# Patient Record
Sex: MALE | Race: BLACK OR AFRICAN AMERICAN | NOT HISPANIC OR LATINO | Employment: FULL TIME | URBAN - METROPOLITAN AREA
[De-identification: names, ages, dates, MRNs, and addresses within clinical notes are randomized per-mention and may not be internally consistent; named-entity substitution may affect disease eponyms.]

---

## 2017-04-03 ENCOUNTER — GENERIC CONVERSION - ENCOUNTER (OUTPATIENT)
Dept: OTHER | Facility: OTHER | Age: 42
End: 2017-04-03

## 2018-01-10 NOTE — MISCELLANEOUS
Dear Batsheva Aguiar : We missed you for your originally scheduled neurological followup appointment with Dr Maribeth Almanza  Please call at your earliest convenience to reschedule this appointment      Sincerely,     Sara Olmstead [address]        Electronically signed by:Cristal Mendoza   Apr  3 2017 11:59AM EST Co-author

## 2018-01-12 NOTE — PROCEDURES
Results/Data    Procedure: Electromyogram and Nerve Conduction Study  Indication: Left Upper and Lower Extremities   Referred by Dr Lori Gusman  The procedure's were discussed with the patient  Written consent was obtained prior to the procedure and is detailed in the patient's record  Prior to the start of the procedure a time out was taken and the identity of the patient was confirmed via name and date of birth with the patient  The correct site and the procedure to be performed were confirmed  The correct side was confirmed if applicable  The positioning of the patient was verified  The availability of the correct equipment was verified  Procedure Start Time: 12:10    Technique: A sterile concentric needle electrode was used  The patient tolerated the procedure well  There were no complications  Results  : Motor and sensory nerve conduction studies were performed on the median, ulnar, peroneal, tibial and sural nerves  The median, ulnar, peroneal and tibial compound motor action potentials were within normal limits  The median, ulnar, tibial F wave latencies were within normal limits  The peroneal F wave was mildly prolonged  The median, ulnar and sural sensory action potentials were within normal limits  The median palmar evoked response was prolonged by 0 4 ms as compared to the ulnar palmar evoked response at the same distance  The right H soleus response was mildly prolonged as compared to the left H soleus response  Concentric needle examination was performed on various proximal and distal muscles including deltoid, biceps, triceps, pronator teres, FDI, vastus lateralis, tibialis anterior, medial gastrocnemius, EDB, L4-5 and L5-S1 paraspinal myotomes  Patient refused testing of the APB secondary to intolerability to pain  Mild decreased recruitment of giant motor units was noted in the tibialis anterior and EDB   The compound motor unit action potentials were of normal configuration with interference patterns being full or full for effort in the remaining muscles tested  Interpretation: There is electrophysiologic evidence of a:    1  Mild median nerve compression neuropathy at the wrist with demyelinative changes, consistent with a diagnosis of carpal tunnel syndrome  2  Mild chronic L5 radiculopathy as evidenced by the decreased recruitment and chronic denervation changes in the above-mentioned muscles  Clinical and imaging correlation of the lumbosacral spine is suggested        Signatures   Electronically signed by : Nelly Allen MD; Jun 22 2016  1:07PM EST                       (Author)

## 2018-01-15 NOTE — RESULT NOTES
Verified Results  (1) CBC/PLT/DIFF [id number] 09:14AM [facility] Blood     Test Name Result Flag Reference   WBC 3 7 x10E3/uL  3 4-10 8   RBC 5 22 x10E6/uL  4 14-5 80   Hemoglobin 14 8 g/dL  12 6-17 7   Hematocrit 46 4 %  37 5-51 0   MCV 89 fL  79-97   MCH 28 4 pg  26 6-33 0   MCHC 31 9 g/dL  31 5-35 7   RDW 13 7 %  12 3-15 4   Platelets 099 N25R4/KE  150-379   Neutrophils 46 %     Lymphs 40 %     Monocytes 9 %     Eos 4 %     Basos 1 %     Neutrophils (Absolute) 1 7 x10E3/uL  1 4-7 0   Lymphs (Absolute) 1 5 x10E3/uL  0 7-3 1   Monocytes(Absolute) 0 3 x10E3/uL  0 1-0 9   Eos (Absolute) 0 1 x10E3/uL  0 0-0 4   Baso (Absolute) 0 0 x10E3/uL  0 0-0 2   Immature Granulocytes 0 %     Immature Grans (Abs) 0 0 x10E3/uL  0 0-0 1     (1) COMPREHENSIVE METABOLIC PANEL [id number] 28:60RK [facility] Blood     Test Name Result Flag Reference   Glucose, Serum 102 mg/dL H 65-99   BUN 19 mg/dL  6-20   Creatinine, Serum 1 13 mg/dL  0 76-1 27   eGFR If NonAfricn Am 81 mL/min/1 73  >59   eGFR If Africn Am 94 mL/min/1 73  >59   BUN/Creatinine Ratio 17  8-19   Sodium, Serum 139 mmol/L  134-144   Potassium, Serum 4 3 mmol/L  3 5-5 2   Chloride, Serum 102 mmol/L     Carbon Dioxide, Total 23 mmol/L  18-29   Calcium, Serum 9 1 mg/dL  8 7-10 2   Protein, Total, Serum 6 8 g/dL  6 0-8 5   Albumin, Serum 4 1 g/dL  3 5-5 5   Globulin, Total 2 7 g/dL  1 5-4 5   A/G Ratio 1 5  1 1-2 5   Bilirubin, Total 0 6 mg/dL  0 0-1 2   Alkaline Phosphatase, S 55 IU/L     AST (SGOT) 17 IU/L  0-40   ALT (SGPT) 22 IU/L  0-44

## 2020-01-29 ENCOUNTER — TELEPHONE (OUTPATIENT)
Dept: NEUROLOGY | Facility: CLINIC | Age: 45
End: 2020-01-29

## 2020-01-29 NOTE — TELEPHONE ENCOUNTER
Staff member from [facility] called and stated that Dr Briseida Jefferson would like to speak to Dr Kaitlynn Moore regarding the patient  Best call back number is [phone]

## 2020-01-29 NOTE — TELEPHONE ENCOUNTER
Patient has unilateral disc edema, discussed with Dr Oleg Marmolejo, please bring the patient this week and also the records from his office and from the ER  Thank you

## 2020-01-30 ENCOUNTER — OFFICE VISIT (OUTPATIENT)
Dept: NEUROLOGY | Facility: CLINIC | Age: 45
End: 2020-01-30
Payer: COMMERCIAL

## 2020-01-30 ENCOUNTER — HOSPITAL ENCOUNTER (OUTPATIENT)
Dept: MRI IMAGING | Facility: CLINIC | Age: 45
Discharge: HOME | End: 2020-01-30
Payer: COMMERCIAL

## 2020-01-30 VITALS
HEART RATE: 76 BPM | BODY MASS INDEX: 26.51 KG/M2 | HEIGHT: 73 IN | DIASTOLIC BLOOD PRESSURE: 80 MMHG | WEIGHT: 200 LBS | SYSTOLIC BLOOD PRESSURE: 110 MMHG

## 2020-01-30 DIAGNOSIS — H47.10 PAPILLEDEMA OF LEFT EYE: Primary | ICD-10-CM

## 2020-01-30 DIAGNOSIS — H47.10 PAPILLEDEMA OF LEFT EYE: ICD-10-CM

## 2020-01-30 DIAGNOSIS — M54.12 CERVICAL RADICULOPATHY: ICD-10-CM

## 2020-01-30 DIAGNOSIS — M54.16 RADICULOPATHY, LUMBAR REGION: ICD-10-CM

## 2020-01-30 DIAGNOSIS — G44.89 OTHER HEADACHE SYNDROME: ICD-10-CM

## 2020-01-30 PROCEDURE — 72156 MRI NECK SPINE W/O & W/DYE: CPT

## 2020-01-30 PROCEDURE — 70553 MRI BRAIN STEM W/O & W/DYE: CPT

## 2020-01-30 PROCEDURE — A9585 GADOBUTROL INJECTION: HCPCS | Performed by: PSYCHIATRY & NEUROLOGY

## 2020-01-30 PROCEDURE — 99204 OFFICE O/P NEW MOD 45 MIN: CPT | Performed by: PSYCHIATRY & NEUROLOGY

## 2020-01-30 RX ORDER — CYCLOBENZAPRINE HCL 10 MG
1 TABLET ORAL
COMMUNITY

## 2020-01-30 RX ORDER — GABAPENTIN 100 MG/1
1 CAPSULE ORAL
COMMUNITY
Start: 2016-11-03

## 2020-01-30 RX ADMIN — GADOBUTROL 9 ML: 604.72 INJECTION INTRAVENOUS at 15:52

## 2020-01-30 NOTE — TELEPHONE ENCOUNTER
[address] [facility] records were scanned in chart on 1/30/2020  Radha Sheppard stated that the ER notes were in care everywhere in the patient's chart

## 2020-01-30 NOTE — PROGRESS NOTES
Collette Stamp is a 38 y o  male  Chief Complaint   Patient presents with    Leg Pain    Blurred Vision     left eye    Back Pain    Numbness     feet       Assessment:  1  Papilledema of left eye    2  Other headache syndrome    3  Cervical radiculopathy    4  Radiculopathy, lumbar region          Discussion:  Differential diagnosis of the papilledema discussed with the patient, will need to rule out demyelinating disorder, doubt pseudotumor as it is unilateral, also would recommend blood work including sedimentation rate C-reactive protein Lyme test JESUS and Ace level, and an MRI scan of the brain and C-spine with and without contrast, patient to call me after the above test to discuss the results, depending on the above test will decide further management, he was advised to go to the hospital and call me if has any worsening symptoms otherwise to see me back in 1 week and follow up with his ophthalmologist and other physicians      Subjective:    HPI   Patient is here for evaluation of unilateral left eye papilledema, patient had seen me in the past a few years ago for low back pain and headaches and according to the patient he was doing well and did not have any headaches or visual symptoms and would continue to have low back pain on and off but felt it was under control and hence did not see me about a year ago he started having some blurriness of the vision mostly in the left eye for which he saw his ophthalmologist and was diagnosed with a cataract and had surgery 6 months back, he feels his vision in the left eye has improved since the cataract surgery but still continues to have some blurriness of the vision especially for near things and he saw his ophthalmologist in return visit yesterday and was found to have disc edema of the left eye and was sent here, he continues to have headaches that he describes mostly on the left side 4 to 5 times a week associated with some photophobia and phonophobia and also has neck pain radiating to the left arm associated with some numbness and tingling and low back pain radiating to the left leg, he denies any loss of vision, no dizziness, no history of trauma, no bowel and bladder incontinence, he has had an MRI scan of the brain in the past which showed T2 white matter changes for which patient was advised to close follow-up and did not come back in follow-up, he did not have any visual symptoms at that time, appetite is good weight has been stable, no recent illnesses  No other complaints,    Vitals:    01/30/20 1300   BP: 110/80   BP Location: Left arm   Patient Position: Sitting   Cuff Size: Adult   Pulse: 76   Weight: 90 7 kg (200 lb)   Height: 6' 1" (1 854 m)       Current Medications    Current Outpatient Medications:     gabapentin (NEURONTIN) 100 mg capsule, Take 1 capsule by mouth daily at bedtime, Disp: , Rfl:     cyclobenzaprine (FLEXERIL) 10 mg tablet, Take 1 tablet by mouth daily at bedtime as needed, Disp: , Rfl:       Allergies  Patient has no known allergies  Past Medical History  History reviewed  No pertinent past medical history  Past Surgical History:  Past Surgical History:   Procedure Laterality Date    CATARACT EXTRACTION Left 06/2019         Family History:  Family History   Problem Relation Age of Onset    Hypertension Mother     Heart disease Father        Social History:   reports that he has never smoked  He has never used smokeless tobacco  He reports that he does not drink alcohol or use drugs  I have reviewed the past medical history, surgical history, social and family history, current medications, allergies vitals, review of systems, and updated this information as appropriate today  Objective:    Physical Exam    Neurological Exam     GENERAL:  Cooperative in no acute distress  Well-developed and well-nourished    HEAD and NECK   Head is atraumatic normocephalic with no lesions or masses   Neck is supple with full range of motion    CARDIOVASCULAR  Carotid Arteries-no carotid bruits  NEUROLOGIC:  Mental Status-the patient is awake alert and oriented without aphasia or apraxia  Cranial Nerves: Visual fields are full to confrontation  Left eye papilledema visual acuity is 20/25 in the right eye and 20/70 in the left eye Extraocular movements are full without nystagmus  Pupils are 2-1/2 mm and reactive  Face is symmetrical to light touch  Movements of facial expression move symmetrically  Hearing is normal to finger rub bilaterally  Soft palate lifts symmetrically  Shoulder shrug is symmetrical  Tongue is midline without atrophy  Motor: No drift is noted on arm extension  Strength is full in the upper and lower extremities with normal bulk and tone  Sensory: Intact to temperature and vibratory sensation in the upper and lower extremities bilaterally  Cortical function is intact  Coordination: Finger to nose testing is performed accurately  Romberg is negative  Gait reveals a normal base with symmetrical arm swing  Tandem walk is normal   Reflexes:    2+ and symmetrical   Toes are downgoing  Paraspinal muscle tenderness in the cervical and lumbar area, no temporal artery tenderness, no meningeal signs  ROS:  Review of Systems   Constitutional: Negative  Negative for appetite change, chills, fatigue and fever  HENT: Negative  Negative for hearing loss, tinnitus, trouble swallowing and voice change  Eyes: Positive for visual disturbance (blurred, left eye)  Negative for photophobia and pain  Respiratory: Negative  Negative for shortness of breath and wheezing  Cardiovascular: Positive for chest pain  Negative for palpitations  Gastrointestinal: Negative  Negative for nausea and vomiting  Endocrine: Negative  Negative for cold intolerance and heat intolerance  Genitourinary: Negative  Negative for dysuria, frequency and urgency     Musculoskeletal: Positive for arthralgias (left shoulder, left arm), back pain and neck pain  Negative for myalgias and neck stiffness  Skin: Negative  Negative for rash  Allergic/Immunologic: Negative  Neurological: Positive for numbness and headaches  Negative for dizziness, tremors, seizures, syncope, facial asymmetry, speech difficulty, weakness and light-headedness  Hematological: Negative  Does not bruise/bleed easily  Psychiatric/Behavioral: Positive for sleep disturbance  Negative for confusion, decreased concentration and hallucinations

## 2020-02-19 ENCOUNTER — OFFICE VISIT (OUTPATIENT)
Dept: NEUROLOGY | Facility: CLINIC | Age: 45
End: 2020-02-19
Payer: COMMERCIAL

## 2020-02-19 VITALS
BODY MASS INDEX: 26.61 KG/M2 | HEART RATE: 64 BPM | WEIGHT: 200.8 LBS | DIASTOLIC BLOOD PRESSURE: 80 MMHG | HEIGHT: 73 IN | SYSTOLIC BLOOD PRESSURE: 112 MMHG

## 2020-02-19 DIAGNOSIS — H47.10 PAPILLEDEMA OF LEFT EYE: Primary | ICD-10-CM

## 2020-02-19 DIAGNOSIS — M54.16 RADICULOPATHY, LUMBAR REGION: ICD-10-CM

## 2020-02-19 PROCEDURE — 99214 OFFICE O/P EST MOD 30 MIN: CPT | Performed by: PSYCHIATRY & NEUROLOGY

## 2020-02-19 NOTE — PROGRESS NOTES
Ana Zavala is a 38 y o  male  Chief Complaint   Patient presents with    Follow-up     Papiledema of left eye       Assessment:  1  Papilledema of left eye    2  Radiculopathy, lumbar region          Discussion:  Patient's MRI scan of the brain and C-spine results were reviewed with him, he does not have any features of demyelinating disorder, also advised the patient to see a neuro ophthalmologist regarding his left eye papilledema I am not sure about the etiology,, his blood work was unremarkable, we will try to obtain the recent no from his ophthalmologist Dr Ino Wilson, also advised the patient to continue follow-up with his orthopedic surgeon regarding his lumbar radiculopathy, to go to the hospital if has any worsening symptoms and call me otherwise to see me back in 4 weeks and follow up with his other physicians  Subjective:    HPI   Patient is here in follow-up for his unilateral left eye papilledema, since his last visit he is doing good, for he feels that his left eye vision is getting better, he has had a history of cataract surgery 6 months back, his vision since the surgery has been improving, it was felt by his ophthalmologist that he had developed left eye papilledema but according to the patient his recent visit he was told it was getting better I do not have those notes, he denies having any headaches at all, he does complain of some low back pain radiating into the left leg for which he is in follow up with Orthopedics  No bowel and bladder incontinence  No other complaints      Vitals:    02/19/20 1549   BP: 112/80   BP Location: Left arm   Patient Position: Sitting   Cuff Size: Adult   Pulse: 64   Weight: 91 1 kg (200 lb 12 8 oz)   Height: 6' 1" (1 854 m)       Current Medications    Current Outpatient Medications:     cyclobenzaprine (FLEXERIL) 10 mg tablet, Take 1 tablet by mouth daily at bedtime as needed, Disp: , Rfl:     gabapentin (NEURONTIN) 100 mg capsule, Take 1 capsule by mouth daily at bedtime, Disp: , Rfl:       Allergies  Patient has no known allergies  Past Medical History  History reviewed  No pertinent past medical history  Past Surgical History:  Past Surgical History:   Procedure Laterality Date    CATARACT EXTRACTION Left 06/2019         Family History:  Family History   Problem Relation Age of Onset    Hypertension Mother     Heart disease Father        Social History:   reports that he has never smoked  He has never used smokeless tobacco  He reports that he does not drink alcohol or use drugs  I have reviewed the past medical history, surgical history, social and family history, current medications, allergies vitals, review of systems, and updated this information as appropriate today  Objective:    Physical Exam    Neurological Exam    GENERAL:  Cooperative in no acute distress  Well-developed and well-nourished    HEAD and NECK   Head is atraumatic normocephalic with no lesions or masses  Neck is supple with full range of motion    CARDIOVASCULAR  Carotid Arteries-no carotid bruits  NEUROLOGIC:  Mental Status-the patient is awake alert and oriented without aphasia or apraxia  Cranial Nerves: Visual fields are full to confrontation  Visual acuity in the right eye is 20/20 in the left eye is 20/25 with hand-held chart Extraocular movements are full without nystagmus  Pupils are 2-1/2 mm and reactive  Face is symmetrical to light touch  Movements of facial expression move symmetrically  Hearing is normal to finger rub bilaterally  Soft palate lifts symmetrically  Shoulder shrug is symmetrical  Tongue is midline without atrophy  Motor: No drift is noted on arm extension  Strength is full in the upper and lower extremities with normal bulk and tone  Coordination: Finger to nose testing is performed accurately  Gait reveals a normal base with symmetrical arm swing     Reflexes:   2+ and symmetric  Paraspinal muscle tenderness in the lumbar area          ROS:  Review of Systems   Constitutional: Negative  Negative for appetite change and fever  HENT: Negative  Negative for hearing loss, tinnitus, trouble swallowing and voice change  Eyes: Positive for photophobia  Negative for pain  Respiratory: Negative  Negative for shortness of breath  Cardiovascular: Negative  Negative for palpitations  Gastrointestinal: Negative  Negative for nausea and vomiting  Endocrine: Negative  Negative for cold intolerance and heat intolerance  Genitourinary: Negative  Negative for dysuria, frequency and urgency  Musculoskeletal: Positive for arthralgias (left leg), back pain, neck pain and neck stiffness  Negative for gait problem and myalgias  Skin: Negative  Negative for rash  Allergic/Immunologic: Negative  Neurological: Positive for headaches  Negative for dizziness, tremors, seizures, syncope, facial asymmetry, speech difficulty, weakness, light-headedness and numbness  Hematological: Negative  Does not bruise/bleed easily  Psychiatric/Behavioral: Negative  Negative for confusion, decreased concentration, hallucinations and sleep disturbance

## 2020-04-14 ENCOUNTER — TELEPHONE (OUTPATIENT)
Dept: NEUROLOGY | Facility: CLINIC | Age: 45
End: 2020-04-14

## 2020-07-30 ENCOUNTER — DOCUMENTATION (OUTPATIENT)
Dept: NEUROLOGY | Facility: CLINIC | Age: 45
End: 2020-07-30

## 2020-07-30 ENCOUNTER — TELEPHONE (OUTPATIENT)
Dept: NEUROLOGY | Facility: CLINIC | Age: 45
End: 2020-07-30

## 2020-07-30 ENCOUNTER — TELEMEDICINE (OUTPATIENT)
Dept: NEUROLOGY | Facility: CLINIC | Age: 45
End: 2020-07-30
Payer: COMMERCIAL

## 2020-07-30 DIAGNOSIS — H47.10 PAPILLEDEMA OF LEFT EYE: Primary | ICD-10-CM

## 2020-07-30 DIAGNOSIS — M54.16 RADICULOPATHY, LUMBAR REGION: ICD-10-CM

## 2020-07-30 PROCEDURE — 99213 OFFICE O/P EST LOW 20 MIN: CPT | Performed by: PSYCHIATRY & NEUROLOGY

## 2020-07-30 NOTE — TELEPHONE ENCOUNTER
Attempted to call at [phone] and [facility] letting patient know we are calling to see if we can go over his medication, allergies, medical history and symptoms before his Video appointment today with Dr Lucero Bernabe at 3:50 pm

## 2020-07-30 NOTE — TELEPHONE ENCOUNTER
Called [phone] and spoke with Marlene Graham and was able to go over his medication, allergies, ectr, before his Video appointment  Patient is able to do Video appointment sooner

## 2020-07-30 NOTE — PROGRESS NOTES
[address] at [phone] and spoke with [facility] desk and they will be faxing over his last visit note

## 2020-07-30 NOTE — PROGRESS NOTES
Virtual Regular Visit      Assessment/Plan:  1  Papilledema of left eye    2  Radiculopathy, lumbar region        Will try to obtain the records from his ophthalmologist Dr Mercedes Ramirez, according to the patient he was told that his eye exam was normal and he was not keen to see a neuro ophthalmologist, also he was advised to continue follow-up with his ophthalmologist and with his orthopedic surgeon regarding his back pain which seems to have resolved, to take fall and safety precautions, keep his blood pressure cholesterol and sugar under control, to go to the hospital if has any worsening symptoms and call me, he will make an appointment in person to see me back in 3 months and follow up with his other physicians  Problem List Items Addressed This Visit     None               Reason for visit is   Chief Complaint   Patient presents with    Follow-up     Numbness in leg comes and goes, eye problems has resolved   Virtual Regular Visit        Encounter provider Glendy Sy MD    Provider located at [facility] [address] [place] [id number]      Recent Visits  No visits were found meeting these conditions  Showing recent visits within past 7 days and meeting all other requirements     Today's Visits  Date Type Provider Dept   07/30/20 Telemedicine Glendy Sy MD [address] [place] [address] today's visits and meeting all other requirements     Future Appointments  No visits were found meeting these conditions  Showing future appointments within next 150 days and meeting all other requirements        The patient was identified by name and date of birth  Malia Biggs was informed that this is a telemedicine visit and that the visit is being conducted through telephone  My office door was closed  No one else was in the room  He acknowledged consent and understanding of privacy and security of the video platform  The patient has agreed to participate and understands they can discontinue the visit at any time  Patient is aware this is a billable service  Subjective  Carolina Montelongo is a 38 y o  male in follow-up for his unilateral left eye papilledema, since his last visit patient tells me that he saw his ophthalmologist and was told that there is no papilledema of the left eye, I do not have those records and we will try to obtain that, he denies any headache, no vision difficulty, no speech difficulty, he has very slight numbness in his left foot sometimes, denies any back pain, he did follow up with an orthopedic surgeon regarding his low back pain which seems to have resolved, he thinks he is back to normal, denying any other complaints  History reviewed  No pertinent past medical history  Past Surgical History:   Procedure Laterality Date    CATARACT EXTRACTION Left 06/2019       Current Outpatient Medications   Medication Sig Dispense Refill    cyclobenzaprine (FLEXERIL) 10 mg tablet Take 1 tablet by mouth daily at bedtime as needed      gabapentin (NEURONTIN) 100 mg capsule Take 1 capsule by mouth daily at bedtime       No current facility-administered medications for this visit  No Known Allergies    Review of Systems   Constitutional: Negative  Negative for appetite change and fever  HENT: Negative  Negative for hearing loss, tinnitus, trouble swallowing and voice change  Eyes: Negative  Negative for photophobia and pain  Respiratory: Negative  Negative for shortness of breath  Cardiovascular: Negative  Negative for palpitations  Gastrointestinal: Negative  Negative for nausea and vomiting  Endocrine: Negative  Negative for cold intolerance  Genitourinary: Negative  Negative for dysuria, frequency and urgency  Musculoskeletal: Negative  Negative for myalgias and neck pain  Skin: Negative  Negative for rash  Allergic/Immunologic: Negative      Neurological: Positive for numbness  Negative for dizziness, tremors, seizures, syncope, facial asymmetry, speech difficulty, weakness, light-headedness and headaches  Hematological: Negative  Does not bruise/bleed easily  Psychiatric/Behavioral: Negative  Negative for confusion, hallucinations and sleep disturbance  All other systems reviewed and are negative  I have reviewed the past medical history, surgical history, social and family history, current medications, allergies vitals, review of systems, and updated this information as appropriate today  Video Exam    There were no vitals filed for this visit  Physical Exam   It was my intent to perform this visit via video technology but the patient was not able to do a video connection so the visit was completed via audio telephone only  I spent 10 minutes directly with the patient during this visit      VIRTUAL VISIT DISCLAIMER    Vini Doty acknowledges that he has consented to an online visit or consultation  He understands that the online visit is based solely on information provided by him, and that, in the absence of a face-to-face physical evaluation by the physician, the diagnosis he receives is both limited and provisional in terms of accuracy and completeness  This is not intended to replace a full medical face-to-face evaluation by the physician  Vini Doty understands and accepts these terms

## 2024-02-14 ENCOUNTER — APPOINTMENT (EMERGENCY)
Dept: CT IMAGING | Facility: HOSPITAL | Age: 49
End: 2024-02-14
Payer: COMMERCIAL

## 2024-02-14 ENCOUNTER — HOSPITAL ENCOUNTER (EMERGENCY)
Facility: HOSPITAL | Age: 49
Discharge: HOME | End: 2024-02-15
Attending: EMERGENCY MEDICINE
Payer: COMMERCIAL

## 2024-02-14 ENCOUNTER — APPOINTMENT (EMERGENCY)
Dept: RADIOLOGY | Facility: HOSPITAL | Age: 49
End: 2024-02-14
Payer: COMMERCIAL

## 2024-02-14 DIAGNOSIS — R51.9 HEADACHE: ICD-10-CM

## 2024-02-14 DIAGNOSIS — R03.0 ELEVATED BLOOD PRESSURE READING: Primary | ICD-10-CM

## 2024-02-14 LAB
ALBUMIN SERPL BCP-MCNC: 4.1 G/DL (ref 3.5–5)
ALP SERPL-CCNC: 52 U/L (ref 34–104)
ALT SERPL W P-5'-P-CCNC: 22 U/L (ref 7–52)
ANION GAP SERPL CALCULATED.3IONS-SCNC: 8 MMOL/L
AST SERPL W P-5'-P-CCNC: 17 U/L (ref 13–39)
BASOPHILS # BLD AUTO: 0.04 THOUSANDS/ÂΜL (ref 0–0.1)
BASOPHILS NFR BLD AUTO: 1 % (ref 0–1)
BILIRUB SERPL-MCNC: 0.34 MG/DL (ref 0.2–1)
BUN SERPL-MCNC: 30 MG/DL (ref 5–25)
CALCIUM SERPL-MCNC: 9.4 MG/DL (ref 8.4–10.2)
CARDIAC TROPONIN I PNL SERPL HS: 4 NG/L
CHLORIDE SERPL-SCNC: 104 MMOL/L (ref 96–108)
CO2 SERPL-SCNC: 25 MMOL/L (ref 21–32)
CREAT SERPL-MCNC: 1.26 MG/DL (ref 0.6–1.3)
EOSINOPHIL # BLD AUTO: 0.12 THOUSAND/ÂΜL (ref 0–0.61)
EOSINOPHIL NFR BLD AUTO: 2 % (ref 0–6)
ERYTHROCYTE [DISTWIDTH] IN BLOOD BY AUTOMATED COUNT: 13.2 % (ref 11.6–15.1)
GFR SERPL CREATININE-BSD FRML MDRD: 67 ML/MIN/1.73SQ M
GLUCOSE SERPL-MCNC: 179 MG/DL (ref 65–140)
HCT VFR BLD AUTO: 44.8 % (ref 36.5–49.3)
HGB BLD-MCNC: 15 G/DL (ref 12–17)
IMM GRANULOCYTES # BLD AUTO: 0.01 THOUSAND/UL (ref 0–0.2)
IMM GRANULOCYTES NFR BLD AUTO: 0 % (ref 0–2)
LYMPHOCYTES # BLD AUTO: 1.97 THOUSANDS/ÂΜL (ref 0.6–4.47)
LYMPHOCYTES NFR BLD AUTO: 31 % (ref 14–44)
MCH RBC QN AUTO: 30.2 PG (ref 26.8–34.3)
MCHC RBC AUTO-ENTMCNC: 33.5 G/DL (ref 31.4–37.4)
MCV RBC AUTO: 90 FL (ref 82–98)
MONOCYTES # BLD AUTO: 0.37 THOUSAND/ÂΜL (ref 0.17–1.22)
MONOCYTES NFR BLD AUTO: 6 % (ref 4–12)
NEUTROPHILS # BLD AUTO: 3.8 THOUSANDS/ÂΜL (ref 1.85–7.62)
NEUTS SEG NFR BLD AUTO: 60 % (ref 43–75)
NRBC BLD AUTO-RTO: 0 /100 WBCS
PLATELET # BLD AUTO: 203 THOUSANDS/UL (ref 149–390)
PMV BLD AUTO: 10.8 FL (ref 8.9–12.7)
POTASSIUM SERPL-SCNC: 4.1 MMOL/L (ref 3.5–5.3)
PROT SERPL-MCNC: 7.1 G/DL (ref 6.4–8.4)
RBC # BLD AUTO: 4.97 MILLION/UL (ref 3.88–5.62)
SODIUM SERPL-SCNC: 137 MMOL/L (ref 135–147)
WBC # BLD AUTO: 6.31 THOUSAND/UL (ref 4.31–10.16)

## 2024-02-14 PROCEDURE — 36415 COLL VENOUS BLD VENIPUNCTURE: CPT

## 2024-02-14 PROCEDURE — 84484 ASSAY OF TROPONIN QUANT: CPT

## 2024-02-14 PROCEDURE — 70450 CT HEAD/BRAIN W/O DYE: CPT

## 2024-02-14 PROCEDURE — 93005 ELECTROCARDIOGRAM TRACING: CPT

## 2024-02-14 PROCEDURE — 71046 X-RAY EXAM CHEST 2 VIEWS: CPT

## 2024-02-14 PROCEDURE — 96367 TX/PROPH/DG ADDL SEQ IV INF: CPT

## 2024-02-14 PROCEDURE — 99284 EMERGENCY DEPT VISIT MOD MDM: CPT

## 2024-02-14 PROCEDURE — 80053 COMPREHEN METABOLIC PANEL: CPT

## 2024-02-14 PROCEDURE — 96365 THER/PROPH/DIAG IV INF INIT: CPT

## 2024-02-14 PROCEDURE — 99285 EMERGENCY DEPT VISIT HI MDM: CPT

## 2024-02-14 PROCEDURE — 85025 COMPLETE CBC W/AUTO DIFF WBC: CPT

## 2024-02-14 RX ORDER — MAGNESIUM SULFATE HEPTAHYDRATE 40 MG/ML
2 INJECTION, SOLUTION INTRAVENOUS ONCE
Status: COMPLETED | OUTPATIENT
Start: 2024-02-14 | End: 2024-02-15

## 2024-02-14 RX ORDER — ACETAMINOPHEN 10 MG/ML
1000 INJECTION, SOLUTION INTRAVENOUS ONCE
Status: COMPLETED | OUTPATIENT
Start: 2024-02-14 | End: 2024-02-14

## 2024-02-14 RX ADMIN — MAGNESIUM SULFATE HEPTAHYDRATE 2 G: 40 INJECTION, SOLUTION INTRAVENOUS at 23:17

## 2024-02-14 RX ADMIN — ACETAMINOPHEN 1000 MG: 10 INJECTION INTRAVENOUS at 22:32

## 2024-02-14 NOTE — Clinical Note
Sanju Fajardo was seen and treated in our emergency department on 2/14/2024.                Diagnosis:     Sanju  .    He may return on this date: 02/16/2024         If you have any questions or concerns, please don't hesitate to call.      Isa Fry RN    ______________________________           _______________          _______________  Hospital Representative                              Date                                Time

## 2024-02-15 VITALS
OXYGEN SATURATION: 99 % | SYSTOLIC BLOOD PRESSURE: 136 MMHG | DIASTOLIC BLOOD PRESSURE: 76 MMHG | TEMPERATURE: 97.6 F | RESPIRATION RATE: 13 BRPM | HEART RATE: 64 BPM

## 2024-02-15 LAB
ATRIAL RATE: 73 BPM
P AXIS: 66 DEGREES
PR INTERVAL: 160 MS
QRS AXIS: 83 DEGREES
QRSD INTERVAL: 86 MS
QT INTERVAL: 380 MS
QTC INTERVAL: 418 MS
T WAVE AXIS: -10 DEGREES
VENTRICULAR RATE: 73 BPM

## 2024-02-15 PROCEDURE — 93010 ELECTROCARDIOGRAM REPORT: CPT | Performed by: INTERNAL MEDICINE

## 2024-02-15 NOTE — ED PROVIDER NOTES
History  Chief Complaint   Patient presents with    Hypertension     Patient reports having a headache all day and reports checking their BP at home this evening and it showing a systolic of 190. Patient reports recently starting on new BP medications two weeks ago. Denies any chest pain shortness of breath or other symptoms aside from headache.      The patient is a 48 y.o. male with a history of HTN who presents to [facility] with a chief complaint of elevated blood pressure. Symptoms began today and have been slightly improved since onset. His pain is currently rated as a 5/10 in severity and described as sharp to the left side of the frontotemporal region without radiation. Associated symptoms include headache. Symptoms are aggravated with none noted and alleviating factors include none noted. The patient denies fever, chills, night sweats, shortness of breath, chest pain, head trauma, loss of consciousness, syncope, palpitations, dizziness, blurred vision. No other reported symptoms at this time.  Patient denies allergies to anything  He reports being started on amlodipine 2 weeks ago for HTN          History provided by:  Patient   used: No    Hypertension  Associated symptoms: headaches    Associated symptoms: no abdominal pain, no chest pain, no dizziness, no ear pain, no epistaxis, no fever, no hematuria, no nausea, no palpitations, no shortness of breath, not vomiting and no weakness        Prior to Admission Medications   Prescriptions Last Dose Informant Patient Reported? Taking?   cyclobenzaprine (FLEXERIL) 10 mg tablet   Yes No   Sig: Take 1 tablet by mouth daily at bedtime as needed   gabapentin (NEURONTIN) 100 mg capsule   Yes No   Sig: Take 1 capsule by mouth daily at bedtime      Facility-Administered Medications: None       History reviewed. No pertinent past medical history.    Past Surgical History:   Procedure Laterality Date    CATARACT EXTRACTION Left  06/2019       Family History   Problem Relation Age of Onset    Hypertension Mother     Heart disease Father      I have reviewed and agree with the history as documented.    E-Cigarette/Vaping    E-Cigarette Use Never User      E-Cigarette/Vaping Substances    Nicotine No     THC No     CBD No     Flavoring No     Other No     Unknown No      Social History     Tobacco Use    Smoking status: Never    Smokeless tobacco: Never   Vaping Use    Vaping status: Never Used   Substance Use Topics    Alcohol use: Never    Drug use: Never       Review of Systems   Constitutional:  Negative for chills and fever.   HENT:  Negative for congestion, ear pain, nosebleeds, postnasal drip and sore throat.    Eyes:  Negative for photophobia, pain, redness and visual disturbance.   Respiratory:  Negative for apnea, cough, choking, chest tightness, shortness of breath, wheezing and stridor.    Cardiovascular:  Negative for chest pain, palpitations and leg swelling.   Gastrointestinal:  Negative for abdominal distention, abdominal pain, anal bleeding, blood in stool, constipation, diarrhea, nausea and vomiting.   Genitourinary:  Negative for dysuria and hematuria.   Musculoskeletal:  Negative for arthralgias and back pain.   Skin:  Negative for color change and rash.   Neurological:  Positive for headaches. Negative for dizziness, tremors, seizures, syncope, facial asymmetry, weakness, light-headedness and numbness.   All other systems reviewed and are negative.      Physical Exam  Physical Exam  Vitals reviewed.   Constitutional:       General: He is not in acute distress.     Appearance: Normal appearance. He is not ill-appearing.   HENT:      Head: Normocephalic and atraumatic.      Right Ear: Tympanic membrane, ear canal and external ear normal.      Left Ear: Tympanic membrane, ear canal and external ear normal.      Nose: Nose normal.      Mouth/Throat:      Pharynx: Oropharynx is clear.   Eyes:      General: No scleral icterus.      Extraocular Movements: Extraocular movements intact.      Conjunctiva/sclera: Conjunctivae normal.      Pupils: Pupils are equal, round, and reactive to light.   Cardiovascular:      Rate and Rhythm: Normal rate.      Pulses: Normal pulses.   Pulmonary:      Effort: Pulmonary effort is normal. No respiratory distress.      Breath sounds: Normal breath sounds. No stridor. No wheezing or rhonchi.   Abdominal:      General: Abdomen is flat.      Tenderness: There is no abdominal tenderness.   Musculoskeletal:         General: Normal range of motion.      Cervical back: Normal range of motion. No rigidity.   Skin:     General: Skin is warm and dry.      Capillary Refill: Capillary refill takes less than 2 seconds.      Coloration: Skin is not jaundiced.   Neurological:      General: No focal deficit present.      Mental Status: He is alert and oriented to person, place, and time.      GCS: GCS eye subscore is 4. GCS verbal subscore is 5. GCS motor subscore is 6.      Cranial Nerves: Cranial nerves 2-12 are intact. No cranial nerve deficit.      Sensory: Sensation is intact.      Motor: Motor function is intact.      Coordination: Coordination is intact.      Gait: Gait is intact.         Vital Signs  ED Triage Vitals [02/14/24 2132]   Temperature Pulse Respirations Blood Pressure SpO2   97.6 °F (36.4 °C) 81 18 (!) 180/91 99 %      Temp Source Heart Rate Source Patient Position - Orthostatic VS BP Location FiO2 (%)   Temporal Monitor Sitting Left arm --      Pain Score       --           Vitals:    02/14/24 2132   BP: (!) 180/91   Pulse: 81   Patient Position - Orthostatic VS: Sitting         Visual Acuity  Visual Acuity      Flowsheet Row Most Recent Value   L Pupil Size (mm) 3   R Pupil Size (mm) 3            ED Medications  Medications - No data to display    Diagnostic Studies  Results Reviewed       Procedure Component Value Units Date/Time    CBC and differential [[id number]] Collected: 02/14/24 2151    Lab  Status: Final result Specimen: Blood from Arm, Left Updated: 02/14/24 2157     WBC 6.31 Thousand/uL      RBC 4.97 Million/uL      Hemoglobin 15.0 g/dL      Hematocrit 44.8 %      MCV 90 fL      MCH 30.2 pg      MCHC 33.5 g/dL      RDW 13.2 %      MPV 10.8 fL      Platelets 203 Thousands/uL      nRBC 0 /100 WBCs      Neutrophils Relative 60 %      Immat GRANS % 0 %      Lymphocytes Relative 31 %      Monocytes Relative 6 %      Eosinophils Relative 2 %      Basophils Relative 1 %      Neutrophils Absolute 3.80 Thousands/µL      Immature Grans Absolute 0.01 Thousand/uL      Lymphocytes Absolute 1.97 Thousands/µL      Monocytes Absolute 0.37 Thousand/µL      Eosinophils Absolute 0.12 Thousand/µL      Basophils Absolute 0.04 Thousands/µL     Comprehensive metabolic panel [[id number]] Collected: 02/14/24 2151    Lab Status: In process Specimen: Blood from Arm, Left Updated: 02/14/24 2154    HS Troponin 0hr (reflex protocol) [[id number]] Collected: 02/14/24 2151    Lab Status: In process Specimen: Blood from Arm, Left Updated: 02/14/24 2154                   XR chest 2 views    (Results Pending)              Procedures  Procedures         ED Course  ED Course as of 02/15/24 0013   Wed Feb 14, 2024   2224 hs TnI 0hr: 4   Thu Feb 15, 2024   0001 CT head without contrast  [facility] read:  No acute large vessel infarction, intracranial bleed or mass   0005 On reevaluation patient states that he is feeling much better, no acute abnormalities on CT of his head.  Patient will like to go home.  Blood pressure improved after medicine.                                             Medical Decision Making  This patient presents with a headache most consistent with benign headache from either tension type headache vs migraine. No headache red flags. Neurologic exam without evidence of meningismus, AMS, focal neurologic findings so doubt meningitis, encephalitis, stroke. Presentation not consistent with acute intracranial bleed to include SAH  "(lack of risk factors, headache history). No history of trauma so doubt ICH. Given history and physical temporal arteritis unlikely, as is acute angle closure glaucoma. Doubt carotid artery dissection given no focal neuro deficits, no neck trauma or recent neck strain. Patient with no signs of increased intracranial pressure or weight loss and history and physical suggest more benign headache so less likely mass effect in brain from tumor or abscess or idiopathic intracranial hypertension. Pain was controlled with headache cocktail and patient discharged home with PCP follow up for management of headaches and HTN. Patient's blood pressure improved with treatment of headache and CT head read by [facility] showed Brain: The gray-white matter interface is maintained. There is no intracranial mass. There is no  intracranial, intra- or extra axial blood.  Cerebral ventricles: The fourth ventricle demonstrates normal contour and configuration. The basal  cisterns are patent. The lateral and third ventricles demonstrate normal contour and configuration  without significant midline shift.  Paranasal sinuses: Posterior right maxillary sinus mucous retention cyst.  Mastoid air cells: Visualized mastoid air cells are well aerated.  Orbital cavities: The orbits are unremarkable.  Bones/joints: The bony calvarium and scalp soft tissues are intact. Hypoplastic frontal sinuses.  Soft tissues: See \"Bones/joints\" finding.  IMPRESSION:  No acute large vessel infarction, intracranial bleed or mass.   Discussed the results with the patient who is feeling much improved. He will follow up with PCP. Given strict return parameters. Patient understands and agrees with treatment plan and follow up.     Problems Addressed:  Elevated blood pressure reading: acute illness or injury  Headache: acute illness or injury    Amount and/or Complexity of Data Reviewed  Labs:  Decision-making details documented in ED Course.  Radiology: ordered. " Decision-making details documented in ED Course.    Risk  Prescription drug management.             Disposition  Final diagnoses:   None     ED Disposition       None          Follow-up Information    None         Patient's Medications   Discharge Prescriptions    No medications on file       No discharge procedures on file.    PDMP Review       None            ED Provider  Electronically Signed by             Jared Mendez PA-C  02/15/24 0554

## 2025-03-27 ENCOUNTER — TELEPHONE (OUTPATIENT)
Age: 50
End: 2025-03-27

## 2025-03-27 NOTE — TELEPHONE ENCOUNTER
Sarina from Dr. Hayes's office called to schedule appt for pt for LBP (lumbar spinal stenosis).  Informed Sarina of our intake process and requested she fax the referral, OV notes and any imaging to [phone].  Sarina will fax this and inform pt to expect a call to complete intake once information is received.

## 2025-03-28 ENCOUNTER — TELEPHONE (OUTPATIENT)
Dept: NEUROSURGERY | Facility: CLINIC | Age: 50
End: 2025-03-28

## 2025-03-28 NOTE — TELEPHONE ENCOUNTER
03/28/2025 RECEIVED FAX CONSULT REQUEST, DEMO, OFFICE NOTES, MRI REPORT, AND LABS FAXED TO [facility]

## 2025-04-09 ENCOUNTER — CONSULT (OUTPATIENT)
Dept: NEUROSURGERY | Facility: CLINIC | Age: 50
End: 2025-04-09
Payer: COMMERCIAL

## 2025-04-09 VITALS
OXYGEN SATURATION: 99 % | HEART RATE: 76 BPM | DIASTOLIC BLOOD PRESSURE: 80 MMHG | SYSTOLIC BLOOD PRESSURE: 160 MMHG | TEMPERATURE: 97.7 F

## 2025-04-09 DIAGNOSIS — M54.50 LOWER BACK PAIN: ICD-10-CM

## 2025-04-09 PROCEDURE — 99203 OFFICE O/P NEW LOW 30 MIN: CPT | Performed by: PHYSICIAN ASSISTANT

## 2025-04-09 RX ORDER — METHYLPREDNISOLONE 4 MG/1
TABLET ORAL
Qty: 21 TABLET | Refills: 0 | Status: SHIPPED | OUTPATIENT
Start: 2025-04-09

## 2025-04-09 NOTE — PROGRESS NOTES
Name: Sanju Fajardo      : 1975      MRN: [id number]  Encounter Provider: Miguel Gupta PA-C  Encounter Date: 2025   Encounter department: [facility] [place]  :  Assessment & Plan  Lower back pain  Patient is a 49 yrs old gentleman with PMHx of Headache syndrome, HTN, Papilledema of left eye, and chronic progressive lower back pain with LLE radiculopathy referred by his PC for evaluation and treatment.    Patient reported he has back pain for many yrs, but it progressed over time. He noticed sitting makes his back pain worse, but lying supine makes aggravates his LLE radiculopathy. Pain is spasmodic , more in the left anterior thigh, associated with intermittent paresthesia and difficulty walking. No red flags.    Patient tried multiple ELISSA without much help, denies PT. Currently, on Gabapentin and Cyclobenzaprine    Imagings:    MRI of Lumbar spine shows  mild multilevel spondylosis of the lumbar spine with associated facet arthropathy resulting in mild multilevel spinal canal stenosis. In addition, there is moderate bilateral L4-5 and moderate to severe bilateral L5-S1 neural foraminal narrowing. Comparison with MRI Lumbar 2016 shows slight progression of L4-5 bilateral NFN.    Plan:     Images shows slight progression of Bilateral NFN at L4-5, patient tried injections at outside pain Mx, without much help.  I would recommend EMG test of LLE to evaluate chronic vs active ongoing neuropathy vs peripheral neuropathy  Amb referral to PT  Script for Medrol dose carolynn sent to his pharmacy. Advised to increase Gabapentin to 200-300, add NSAIDs and Tylenol  F/U after EMG test and trial of conservative Mx  Questions and concerns were answered to patient's satisfaction. Patient verbalized understandings and agreed with the plan.    Orders:    Ambulatory Referral to Neurosurgery    methylPREDNISolone 4 MG tablet therapy pack; Use as directed on package    Ambulatory Referral to Physical  Therapy; Future    EMG; Future        History of Present Illness     Sanju Fajardo is a 49 y.o. male here today referred by his PCP for eval of LLE radic    HPI   Patient is a 49 yrs old gentleman with PMHx of Headache syndrome, HTN, Papilledema of left eye, and chronic progressive lower back pain with LLE radiculopathy referred by his PC for evaluation and treatment. Patient reports his Lower back pain progressed in the past few months, pain shoots down left posterior hip to the anterior thigh and posterior calf to the left ankle. Pain is spasmodic, thigh pain and paresthesia is worse with lying down at night, and lower left back pain worse with sitting. He also noticed radicular pain and paresthesia aggravated with exercise. He claims slight weakness in the left LE, with difficulty walking . No red flags, slight limping gait. Denies fever, chills, rigors, cough or chest pain. Patient tried 3 injections in the past without help. Denies PT, currently on Gabapentin and Flexeril.    Patient denies Hx of DM, CHF, Stroke, seizures, bleeding disorder or taking AC. Denies smoking cigarettes or drinking alcohol.    Review of Systems   Constitutional: Negative.    HENT: Negative.     Eyes: Negative.    Respiratory: Negative.     Cardiovascular: Negative.    Gastrointestinal: Negative.    Endocrine: Negative.    Genitourinary: Negative.  Negative for frequency and urgency.   Musculoskeletal:  Positive for back pain, gait problem and myalgias.        CONSULT LSPINE - lower back pain   MRI Lspine on 3/6/25 [facility]      7-10/10 LBP- worse at night, Radiating down BLE L>R  N/T- LLE  Difficulty walking in the morning    PT-None  PM- [facility] in [place] 4 months ago. 3 injections over 3 months 0% relief   EMG- 6 months ago     No ac/Non smoker/No previous back sx        Skin: Negative.    Allergic/Immunologic: Negative.    Neurological:  Positive for weakness and numbness.   Hematological: Negative.    Psychiatric/Behavioral:   Positive for sleep disturbance.    All other systems reviewed and are negative.    I have personally reviewed the MA's review of systems and made changes as necessary.    Past Medical History   History reviewed. No pertinent past medical history.  Past Surgical History:   Procedure Laterality Date    CATARACT EXTRACTION Left 06/2019     Family History   Problem Relation Age of Onset    Hypertension Mother     Heart disease Father      he reports that he has never smoked. He has never used smokeless tobacco. He reports that he does not drink alcohol and does not use drugs.  Current Outpatient Medications   Medication Instructions    cyclobenzaprine (FLEXERIL) 10 mg tablet 1 tablet, Daily at bedtime PRN    gabapentin (NEURONTIN) 100 mg capsule 1 capsule, Daily at bedtime   No Known Allergies   Objective   /80 (Patient Position: Sitting, Cuff Size: Standard)   Pulse 76   Temp 97.7 °F (36.5 °C) (Temporal)   SpO2 99%     Physical Exam  Constitutional:       Appearance: Normal appearance.   HENT:      Head: Normocephalic and atraumatic.   Eyes:      Extraocular Movements: Extraocular movements intact.      Pupils: Pupils are equal, round, and reactive to light.   Pulmonary:      Effort: Pulmonary effort is normal.   Musculoskeletal:         General: Tenderness present.      Cervical back: Normal range of motion.   Neurological:      General: No focal deficit present.      Mental Status: He is oriented to person, place, and time.      Motor: Motor strength is normal.     Deep Tendon Reflexes:      Reflex Scores:       Tricep reflexes are 2+ on the right side and 2+ on the left side.       Bicep reflexes are 2+ on the right side and 2+ on the left side.       Brachioradialis reflexes are 2+ on the right side and 2+ on the left side.       Patellar reflexes are 2+ on the right side and 2+ on the left side.       Achilles reflexes are 2+ on the right side and 2+ on the left side.  Psychiatric:         Speech: Speech  normal.       Neurological Exam  Mental Status  Awake, alert and oriented to person, place and time. Oriented to person, place, time and situation. Oriented to person, place, and time. Speech is normal. Language is fluent with no aphasia. Attention and concentration are normal. Fund of knowledge is appropriate for level of education.    Cranial Nerves  CN III, IV, VI: Extraocular movements intact bilaterally. Pupils equal round and reactive to light bilaterally.    Motor  Normal muscle bulk throughout. No fasciculations present. Normal muscle tone. No abnormal involuntary movements. Strength is 5/5 throughout all four extremities.    Sensory  Light touch is normal in upper and lower extremities.     Reflexes                                            Right                      Left  Brachioradialis                    2+                         2+  Biceps                                 2+                         2+  Triceps                                2+                         2+  Patellar                                2+                         2+  Achilles                                2+                         2+    Right pathological reflexes: Alexandria's absent. Ankle clonus absent.  Left pathological reflexes: Alexandria's absent. Ankle clonus absent.    Coordination  Right: Finger-to-nose normal.Left: Finger-to-nose normal.    Gait  Casual gait is normal including stance, stride, and arm swing.      Radiology Results Review: I personally reviewed the following image studies in PACS and associated radiology reports: MRI spine. My interpretation of the radiology images/reports is: see discussion above.    Administrative Statements   I have spent a total time of 30 minutes in caring for this patient on the day of the visit/encounter including Diagnostic results, Prognosis, Risks and benefits of tx options, Instructions for management, Patient and family education, Importance of tx compliance, Risk factor  reductions, Impressions, Documenting in the medical record, Reviewing/placing orders in the medical record (including tests, medications, and/or procedures), and Obtaining or reviewing history  .

## 2025-06-23 ENCOUNTER — TELEPHONE (OUTPATIENT)
Dept: NEUROLOGY | Facility: CLINIC | Age: 50
End: 2025-06-23

## 2025-06-23 ENCOUNTER — PROCEDURE VISIT (OUTPATIENT)
Dept: NEUROLOGY | Facility: CLINIC | Age: 50
End: 2025-06-23
Payer: COMMERCIAL

## 2025-06-23 DIAGNOSIS — M54.50 LOWER BACK PAIN: ICD-10-CM

## 2025-06-23 DIAGNOSIS — R20.2 PARESTHESIA: Primary | ICD-10-CM

## 2025-06-23 PROCEDURE — 95886 MUSC TEST DONE W/N TEST COMP: CPT | Performed by: PHYSICAL MEDICINE & REHABILITATION

## 2025-06-23 PROCEDURE — 95908 NRV CNDJ TST 3-4 STUDIES: CPT | Performed by: PHYSICAL MEDICINE & REHABILITATION

## 2025-06-27 ENCOUNTER — TELEPHONE (OUTPATIENT)
Age: 50
End: 2025-06-27

## 2025-06-27 ENCOUNTER — OFFICE VISIT (OUTPATIENT)
Dept: NEUROSURGERY | Facility: CLINIC | Age: 50
End: 2025-06-27
Payer: COMMERCIAL

## 2025-06-27 VITALS
OXYGEN SATURATION: 99 % | HEART RATE: 72 BPM | SYSTOLIC BLOOD PRESSURE: 130 MMHG | TEMPERATURE: 97.3 F | DIASTOLIC BLOOD PRESSURE: 80 MMHG

## 2025-06-27 DIAGNOSIS — M54.16 RADICULOPATHY, LUMBAR REGION: Primary | ICD-10-CM

## 2025-06-27 PROCEDURE — 99214 OFFICE O/P EST MOD 30 MIN: CPT | Performed by: NURSE PRACTITIONER

## 2025-06-27 NOTE — TELEPHONE ENCOUNTER
Caller: Sanju     Doctor: Dr. Price    Reason for call: Please mail new patient paperwork to address on file. Thank you     Call back#: [phone]

## 2025-06-27 NOTE — PROGRESS NOTES
Name: Sanju Fajardo      : 1975      MRN: [id number]  Encounter Provider: Craig Robert Goldberg, MD  Encounter Date: 2025   Encounter department: [facility]  :  Assessment & Plan  Radiculopathy, lumbar region  Patient seen in outpatient office today to review EMG  Patient states his symptoms started a few years ago as a gradual onset without precipitating events or traumas.  He states over the past 6 months - 1 year his symptoms have worsened.  He is complaining of constant left-sided low back pain which radiates into his left posterior/lateral leg into his whole foot and N/T.  He states sitting and laying down worsens his symptoms and standing and walking helps.  He has not seen physical therapy.  He does follow with an outside pain management physician and underwent 3 ELISSA injections which helped for about 2-3 weeks last injection was in February or March.  He has never received any nerve blocks or nerve ablations.    Imaging:    MRI lumbar spine without 3/6/25: Mild multilevel spondylosis of the lumbar spine with associated facet arthropathy resulting in mild multilevel spinal canal stenosis.  In addition, there is moderate bilateral L4-5 and moderate to severe   bilateral L5-S1 neural foraminal narrowing.    Plan:  Reviewed imaging with patient   Did briefly discuss possible fusion with patient but would recommend patient exhaust all nonsurgical options with physical therapy and pain management for possible nerve blocks/ablations.  Placed referrals for both but patient will likely follow-up with his already established pain management physician.  Patient will follow up as needed or if symptoms persist or worsen after trialing conservative management  If patient calls back, please schedule him a joint appointment with spine surgeon  Patient made aware to seek care sooner if he develops any new or worsening neurological change or red flag signs  Patient made aware to  contact neurosurgery with any further question concerns.    Orders:    Ambulatory referral to Spine & Pain Management; Future    Ambulatory Referral to Physical Therapy; Future        History of Present Illness     Sanju Fajardo is a 49 y.o. male with past medical history significant for papilledema of left eye, headaches, and cataract surgery.  Patient seen in outpatient office today to review EMG.    Patient states his symptoms started a few years ago and it was a gradual onset without precipitating events or traumas.  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  He states symptoms have progressively worsened over the past 6 months - 1 year.    He is currently complaining of 8/10 sharp constant left-sided low back pain which radiates into his left posterior/lateral leg into his whole foot which is also constant.  He states sitting or laying especially in his left side worsens his symptoms.  He states standing or walking as well as taking gabapentin helps.  He reports his pain is worse at night.  He reports occasional headaches as well as tingling in his left posterior/lateral leg and foot which is intermittent.  He denies any symptoms in his right leg.  He states he has some discomfort going up the steps.  He denies any recent falls or traumas or difficulty with his balance.  He denies any bowel or bladder issues or saddle anesthesia.    Patient has not seen physical therapy.  He does follow with an outside pain management physician and states he underwent 3 ELISSA injections which helped for about 2-3 weeks, last injection was in February or March.  He has never received any ablations or nerve blocks.    Reviewed imaging with patient he has a disc bulge at L4-5 with mild canal stenosis but moderate bilateral neural foraminal narrowing.  There is also disc bulge at L5-S1 with bilateral facet arthropathy.  With moderate to severe bilateral neural foraminal narrowing.  EMG showed chronic L4-5 and L5-S1 radiculopathy.   Recommend patient exhaust all nonsurgical options with physical therapy and pain management for possible nerve blocks or nerve ablations.  Placed referrals for both but patient will likely follow-up with his already established pain management physician.  Did briefly discussed with patient possible spinal fusion but again would exhaust all nonsurgical options.  Patient will follow-up as needed if symptoms persist or worsen after trialing conservative management.    HPI     Review of Systems   Constitutional: Negative.    HENT: Negative.     Eyes: Negative.    Respiratory: Negative.     Cardiovascular: Negative.    Gastrointestinal: Negative.    Endocrine: Negative.    Genitourinary: Negative.  Negative for frequency and urgency.   Musculoskeletal:  Positive for back pain, gait problem and myalgias.        F/U after emg on 6/23/25  Lower back pain    7-10/10 LBP- worse at night, Radiating down  LLE  N/T- LLE into toes  Difficulty walking in the morning, sitting /driving too long      PT-none  PM- [facility] in [place] 4 months ago. 3 injections over 3 months 0% relief      No ac/Non smoker/No previous back sx        Skin: Negative.    Allergic/Immunologic: Negative.    Neurological:  Positive for weakness (LLE) and numbness (LLE).   Hematological: Negative.    Psychiatric/Behavioral:  Positive for sleep disturbance (due to pain).    All other systems reviewed and are negative.    ROS reviewed with patient and agree and changes were made as needed    Past Medical History   Past Medical History[1]  Past Surgical History[2]  Family History[3]  he reports that he has never smoked. He has never used smokeless tobacco. He reports that he does not drink alcohol and does not use drugs.  Current Outpatient Medications   Medication Instructions    cyclobenzaprine (FLEXERIL) 10 mg tablet 1 tablet, Daily at bedtime PRN    gabapentin (NEURONTIN) 100 mg capsule 1 capsule, Daily at bedtime    methylPREDNISolone 4 MG tablet  therapy pack Use as directed on package   Allergies[4]   Objective   /80 (Patient Position: Sitting, Cuff Size: Standard)   Pulse 72   Temp (!) 97.3 °F (36.3 °C) (Temporal)   SpO2 99%     Physical Exam  Vitals reviewed.   Constitutional:       General: He is not in acute distress.     Appearance: Normal appearance. He is not ill-appearing.   HENT:      Head: Normocephalic and atraumatic.     Eyes:      Extraocular Movements: Extraocular movements intact.      Conjunctiva/sclera: Conjunctivae normal.      Pupils: Pupils are equal, round, and reactive to light.       Cardiovascular:      Rate and Rhythm: Normal rate.   Pulmonary:      Effort: Pulmonary effort is normal. No respiratory distress.   Chest:      Chest wall: No tenderness.   Abdominal:      General: There is no distension.      Palpations: Abdomen is soft.      Tenderness: There is no abdominal tenderness.     Musculoskeletal:         General: Normal range of motion.      Cervical back: Normal range of motion and neck supple.      Thoracic back: No tenderness.      Lumbar back: No tenderness.     Skin:     General: Skin is warm and dry.     Neurological:      Mental Status: He is oriented to person, place, and time.      Deep Tendon Reflexes:      Reflex Scores:       Bicep reflexes are 1+ on the right side and 1+ on the left side.       Patellar reflexes are 1+ on the right side and 1+ on the left side.    Psychiatric:         Attention and Perception: Attention and perception normal.         Mood and Affect: Mood and affect normal.         Speech: Speech normal.         Behavior: Behavior normal. Behavior is cooperative.         Thought Content: Thought content normal.         Cognition and Memory: Cognition and memory normal.         Judgment: Judgment normal.       Neurological Exam  Mental Status  Awake, alert and oriented to person, place and time. Oriented to person, place, and time. Memory is normal. Speech is normal.    Cranial Nerves  CN  III, IV, VI: Extraocular movements intact bilaterally. Pupils equal round and reactive to light bilaterally.  CN V: Facial sensation is normal.  CN VII: Full and symmetric facial movement.  CN VIII: Hearing is normal.  CN XI: Shoulder shrug strength is normal.  CN XII: Tongue midline without atrophy or fasciculations.    Motor  Normal muscle bulk throughout. Normal muscle tone.  Strength 5/5 throughout.    Sensory  Light touch is normal in upper and lower extremities.     Reflexes                                            Right                      Left  Biceps                                 1+                         1+  Patellar                                1+                         1+    Right pathological reflexes: Alexandria's absent. Ankle clonus absent.  Left pathological reflexes: Alexandria's absent. Ankle clonus absent.    Coordination  Right: Finger-to-nose normal.Left: Finger-to-nose normal.    Gait  Casual gait is normal including stance, stride, and arm swing.      Radiology Results Review: I personally reviewed the following image studies in PACS and associated radiology reports: EMG and MRI spine. My interpretation of the radiology images/reports is:  .               [1] No past medical history on file.  [2]   Past Surgical History:  Procedure Laterality Date    CATARACT EXTRACTION Left 06/2019   [3]   Family History  Problem Relation Name Age of Onset    Hypertension Mother      Heart disease Father     [4] No Known Allergies

## 2025-06-27 NOTE — PATIENT INSTRUCTIONS
Please reach out to your pain management doctor in regards to possible nerve blocks or nerve ablations    Please referral to physical therapy and pain management if needed    Contact the office if symptoms persist or worsen after trialing conservative management

## 2025-06-27 NOTE — ASSESSMENT & PLAN NOTE
Patient seen in outpatient office today to review EMG  Patient states his symptoms started a few years ago as a gradual onset without precipitating events or traumas.  He states over the past 6 months - 1 year his symptoms have worsened.  He is complaining of constant left-sided low back pain which radiates into his left posterior/lateral leg into his whole foot and N/T.  He states sitting and laying down worsens his symptoms and standing and walking helps.  He has not seen physical therapy.  He does follow with an outside pain management physician and underwent 3 ELISSA injections which helped for about 2-3 weeks last injection was in February or March.  He has never received any nerve blocks or nerve ablations.    Imaging:    MRI lumbar spine without 3/6/25: Mild multilevel spondylosis of the lumbar spine with associated facet arthropathy resulting in mild multilevel spinal canal stenosis.  In addition, there is moderate bilateral L4-5 and moderate to severe   bilateral L5-S1 neural foraminal narrowing.    Plan:  Reviewed imaging with patient   Did briefly discuss possible fusion with patient but would recommend patient exhaust all nonsurgical options with physical therapy and pain management for possible nerve blocks/ablations.  Placed referrals for both but patient will likely follow-up with his already established pain management physician.  Patient will follow up as needed or if symptoms persist or worsen after trialing conservative management  If patient calls back, please schedule him a joint appointment with spine surgeon  Patient made aware to seek care sooner if he develops any new or worsening neurological change or red flag signs  Patient made aware to contact neurosurgery with any further question concerns.    Orders:    Ambulatory referral to Spine & Pain Management; Future    Ambulatory Referral to Physical Therapy; Future

## 2025-07-02 ENCOUNTER — OFFICE VISIT (OUTPATIENT)
Dept: NEUROLOGY | Facility: CLINIC | Age: 50
End: 2025-07-02
Payer: COMMERCIAL

## 2025-07-02 VITALS
WEIGHT: 198 LBS | HEART RATE: 63 BPM | HEIGHT: 73 IN | SYSTOLIC BLOOD PRESSURE: 130 MMHG | BODY MASS INDEX: 26.24 KG/M2 | OXYGEN SATURATION: 97 % | DIASTOLIC BLOOD PRESSURE: 78 MMHG

## 2025-07-02 DIAGNOSIS — M54.16 RADICULOPATHY, LUMBAR REGION: Primary | ICD-10-CM

## 2025-07-02 PROCEDURE — 99203 OFFICE O/P NEW LOW 30 MIN: CPT | Performed by: PSYCHIATRY & NEUROLOGY

## 2025-07-02 NOTE — PROGRESS NOTES
Neurology Ambulatory Visit  Name: Sanju Fajardo       : 1975       MRN: [id number]   Encounter Provider: Layla Damon MD   Encounter Date: 2025  Encounter department: [facility]      Sanju Fajardo is a 49 y.o. male.       Assessment & Plan  Radiculopathy, lumbar region         Patient's recent imaging study results and neurosurgery and other neurology notes were reviewed ,patient was advised to go for physical therapy, and also was advised to increase gabapentin to 200 mg at nighttime side effects discussed with him, he has an appointment to see the pain specialist, also was advised to avoid strenuous type of activities to follow-up with an orthopedic surgeon regarding his left shoulder pain, keep his blood pressure cholesterol sugar under control, to go to the hospital if has any worsening symptoms and call me otherwise to see me back in 3 to 4 months or sooner if needed and follow-up with the other physicians    Subjective:  Low back pain radiating to the left leg    HISTORY OF PRESENT ILLNESS  49-year-old male with past medical history significant for questionable papilledema of the left eye, status post cataract surgery who is here for evaluation of low back pain radiating to the left leg, according to the patient he is not having any headaches or vision symptoms and tells me that he saw his ophthalmologist and was told that there is no papilledema he has been having low back pain for the last 2 years on and off radiating to the left leg it is worse at nighttime 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 does get relief when he is standing up he has been taking gabapentin 100 mg at nighttime he did see another neurologist at [facility] and February and was told to increase the gabapentin to 200 mg which he tells me that he has not done he has not gone for physical therapy he recently saw the neurosurgeon and was advised to go for  "physical therapy and pain management he has an appointment with pain management at the end of the month, no bowel and bladder incontinence he recently had an injury at work with his left shoulder and went to the ER and had x-rays of the shoulder that did not show any evidence of fracture he is going to follow-up with an orthopedic surgeon, he has undergone epidural injections in the past that has helped him he has never received any ablation or nerve blocks.          Prior Work-up:   MRI: Lumbar spine shows a disc bulge at L4-L5 with mild canal stenosis but moderate bilateral neural foraminal narrowing there is also disc bulge at L5-S1 with bilateral facet arthropathy with moderate to severe bilateral neural foraminal narrowing, there is also disc bulge at L5-S1 with bilateral facet arthropathy with moderate to severe bilateral neural foraminal narrowing, EMG showed chronic L4-5 and L5-S1 radiculopathy       Past Medical History:    Past Medical History[1]  Past Surgical History[2]    Family History:  Family History[3]    Social History:  Social History[4]   Living situation:    Work:      Allergies:  Allergies[5]    Medications:  Current Medications[6]    Objective:  /78 (BP Location: Left arm, Patient Position: Sitting, Cuff Size: Large)   Pulse 63   Ht 6' 1\" (1.854 m)   Wt 89.8 kg (198 lb)   SpO2 97%   BMI 26.12 kg/m²      Labs  I have reviewed pertinent labs:  CBC:   Lab Results   Component Value Date    WBC 6.31 02/14/2024    RBC 4.97 02/14/2024    HGB 15.0 02/14/2024    HCT 44.8 02/14/2024    MCV 90 02/14/2024     02/14/2024    MCH 30.2 02/14/2024    MCHC 33.5 02/14/2024    RDW 13.2 02/14/2024    MPV 10.8 02/14/2024    NEUTROABS 3.80 02/14/2024     CMP:   Lab Results   Component Value Date    SODIUM 139 04/26/2025    K 4.2 04/26/2025     04/26/2025    CO2 31 04/26/2025    AGAP 5 04/26/2025    BUN 17 04/26/2025    CREATININE 1.26 04/26/2025    GLUC 104 (H) 04/26/2025    CALCIUM 9 " "04/26/2025    AST 14 04/26/2025    ALT 14 04/26/2025    ALKPHOS 57 03/15/2025    TP 7.2 03/15/2025    ALB 4.3 03/15/2025    TBILI 0.7 03/15/2025    EGFR 70 04/26/2025     Thyroid Studies: No results found for: \"LWV4BPUYZGYH\", \"T3FREE\", \"FREET4\", \"L8PSFRI\", \"X0CKGXN\"  Hemoglobin A1C/EST AVG Glucose   Lab Results   Component Value Date    HGBA1C 6 (H) 04/06/2024     04/06/2024     Vitamin D Level No results found for: \"OCCE47QHSHJZ\", \"JRAJ398UOWW\"  Vitamin B12   Lab Results   Component Value Date    SPCGXEJA29 706 01/31/2025     Folate No results found for: \"FOLATE\"           General Exam  GENERAL APPEARANCE:  No distress, alert, interactive and cooperative.  CARDIOVASCULAR: Warm and well perfused  LUNGS: normal work of breathing on room air  EXTREMITIES: no peripheral edema     Neurologic Exam  MENTAL STATE:  Orientation was normal to time, place and person without aphasia or apraxia. Recent and remote memory was intact.  Attention span and concentration were normal. Language testing was normal for comprehension, repetition, expression, and naming.     CRANIAL NERVES:  CN 2       visual fields full to confrontation.  Visual acuity is 20/20 with hand-held chart  CN 3, 4, 6  Pupils round, 4 mm in diameter, equally reactive to light. Lids symmetric; no ptosis. EOMs normal alignment, full range. No nystagmus.  CN 5  Facial sensation intact bilaterally.  CN 7  Normal and symmetric facial strength.   CN 8  Hearing intact to finger rub bilaterally.  CN 9  Palate elevates symmetrically.  CN 11  Normal strength of shoulder shrug and neck turning.  CN 12  Tongue midline, with normal bulk and strength.     MOTOR:  Motor exam was normal. Muscle bulk and tone were normal in both upper and lower extremities. Muscle strength was 5/5 in distal and proximal muscles in both upper and lower extremities. No fasciculations, tremor or other abnormal movements were present.     REFLEXES:  RIGHT UE   LEFT UE  BR:2              BR:2  "   Biceps:2      Biceps:2    Triceps:2     Triceps:2       RIGHT LLE   LEFT LLE    Knee:2           Knee:2    Ankle:2         Ankle:2    Babinski: toes downgoing to plantar stimulation. No clonus.     SENSORY:  Intact to temperature and vibratory sensation in the upper and lower extremities bilaterally. Cortical function is intact.    COORDINATION:   Coordination exam was normal. In both upper extremities, finger-nose-finger was intact without dysmetria or overshoot.      GAIT:  Gait was normal. Station was stable with a normal base. Gait was stable with a normal arm swing and speed. Tandem gait was intact. No Romberg sign was present.    Paraspinal muscle tenderness in the lumbar area.      ROS:  Review of Systems   Constitutional:  Negative for appetite change, fatigue and fever.   HENT: Negative.  Negative for hearing loss, tinnitus, trouble swallowing and voice change.    Eyes: Negative.  Negative for photophobia, pain and visual disturbance.   Respiratory: Negative.  Negative for shortness of breath.    Cardiovascular: Negative.  Negative for palpitations.   Gastrointestinal: Negative.  Negative for nausea and vomiting.   Endocrine: Negative.  Negative for cold intolerance.   Genitourinary: Negative.  Negative for dysuria, frequency and urgency.   Musculoskeletal:  Positive for back pain. Negative for gait problem, myalgias, neck pain and neck stiffness.   Skin: Negative.  Negative for rash.   Allergic/Immunologic: Negative.    Neurological:  Positive for numbness. Negative for dizziness, tremors, seizures, syncope, facial asymmetry, speech difficulty, weakness, light-headedness and headaches.   Hematological: Negative.  Does not bruise/bleed easily.   Psychiatric/Behavioral: Negative.  Negative for confusion, hallucinations and sleep disturbance.        I have reviewed the past medical history, surgical history, social and family history, current medications, allergies vitals, review of systems, and updated  this information as appropriate today.    Administrative Statements   The total amount of time spent with the patient and on chart review and documentation was minutes. Issues addressed during this clinic visit included overall management, medication counseling or monitoring, and counseling and coordination of care.         Layla Damon MD                [1] No past medical history on file.  [2]   Past Surgical History:  Procedure Laterality Date    CATARACT EXTRACTION Left 06/2019   [3]   Family History  Problem Relation Name Age of Onset    Hypertension Mother      Heart disease Father     [4]   Social History  Tobacco Use    Smoking status: Never    Smokeless tobacco: Never   Vaping Use    Vaping status: Never Used   Substance Use Topics    Alcohol use: Never    Drug use: Never   [5] No Known Allergies  [6]   Current Outpatient Medications:     cyclobenzaprine (FLEXERIL) 10 mg tablet, Take 1 tablet by mouth daily at bedtime as needed, Disp: , Rfl:     gabapentin (NEURONTIN) 100 mg capsule, 2 tablets at nighttime, Disp: 60 capsule, Rfl: 5    methylPREDNISolone 4 MG tablet therapy pack, Use as directed on package (Patient not taking: Reported on 7/2/2025), Disp: 21 tablet, Rfl: 0

## 2025-07-30 ENCOUNTER — CONSULT (OUTPATIENT)
Dept: PAIN MEDICINE | Facility: CLINIC | Age: 50
End: 2025-07-30
Attending: NURSE PRACTITIONER
Payer: COMMERCIAL

## 2025-07-30 VITALS — HEIGHT: 73 IN | BODY MASS INDEX: 26.66 KG/M2 | WEIGHT: 201.2 LBS

## 2025-07-30 DIAGNOSIS — M54.16 RADICULOPATHY, LUMBAR REGION: Primary | ICD-10-CM

## 2025-07-30 DIAGNOSIS — M25.512 ACUTE PAIN OF LEFT SHOULDER: ICD-10-CM

## 2025-07-30 PROCEDURE — 99204 OFFICE O/P NEW MOD 45 MIN: CPT | Performed by: STUDENT IN AN ORGANIZED HEALTH CARE EDUCATION/TRAINING PROGRAM

## 2025-07-30 RX ORDER — MELOXICAM 15 MG/1
15 TABLET ORAL DAILY
COMMUNITY
Start: 2025-07-17 | End: 2025-07-30

## 2025-07-30 RX ORDER — EZETIMIBE 10 MG/1
TABLET ORAL
COMMUNITY
Start: 2025-07-27

## 2025-07-30 RX ORDER — DICLOFENAC SODIUM 75 MG/1
75 TABLET, DELAYED RELEASE ORAL 2 TIMES DAILY PRN
Qty: 60 TABLET | Refills: 0 | Status: SHIPPED | OUTPATIENT
Start: 2025-07-30

## 2025-07-30 RX ORDER — GABAPENTIN 300 MG/1
300 CAPSULE ORAL 3 TIMES DAILY
Qty: 90 CAPSULE | Refills: 1 | Status: SHIPPED | OUTPATIENT
Start: 2025-07-30

## 2025-07-30 RX ORDER — AMLODIPINE AND BENAZEPRIL HYDROCHLORIDE 10; 40 MG/1; MG/1
CAPSULE ORAL
COMMUNITY
Start: 2025-07-27

## 2025-07-30 RX ORDER — METHYLPREDNISOLONE 4 MG/1
TABLET ORAL
Qty: 1 EACH | Refills: 0 | Status: SHIPPED | OUTPATIENT
Start: 2025-07-30

## 2025-07-30 RX ORDER — ROSUVASTATIN CALCIUM 40 MG/1
TABLET, COATED ORAL
COMMUNITY
Start: 2025-07-27

## 2025-07-31 ENCOUNTER — TELEPHONE (OUTPATIENT)
Age: 50
End: 2025-07-31

## 2025-08-01 ENCOUNTER — PROCEDURE VISIT (OUTPATIENT)
Dept: PAIN MEDICINE | Facility: CLINIC | Age: 50
End: 2025-08-01
Payer: COMMERCIAL

## 2025-08-01 VITALS — BODY MASS INDEX: 26.64 KG/M2 | HEIGHT: 73 IN | WEIGHT: 201 LBS

## 2025-08-01 DIAGNOSIS — M75.52 SUBACROMIAL BURSITIS OF LEFT SHOULDER JOINT: Primary | ICD-10-CM

## 2025-08-01 PROCEDURE — 20611 DRAIN/INJ JOINT/BURSA W/US: CPT | Performed by: STUDENT IN AN ORGANIZED HEALTH CARE EDUCATION/TRAINING PROGRAM

## 2025-08-01 RX ORDER — BUPIVACAINE HYDROCHLORIDE 2.5 MG/ML
4 INJECTION, SOLUTION EPIDURAL; INFILTRATION; INTRACAUDAL; PERINEURAL ONCE
Status: COMPLETED | OUTPATIENT
Start: 2025-08-01 | End: 2025-08-01

## 2025-08-01 RX ORDER — METHYLPREDNISOLONE ACETATE 40 MG/ML
40 INJECTION, SUSPENSION INTRA-ARTICULAR; INTRALESIONAL; INTRAMUSCULAR; SOFT TISSUE ONCE
Status: COMPLETED | OUTPATIENT
Start: 2025-08-01 | End: 2025-08-01

## 2025-08-01 RX ADMIN — METHYLPREDNISOLONE ACETATE 40 MG: 40 INJECTION, SUSPENSION INTRA-ARTICULAR; INTRALESIONAL; INTRAMUSCULAR; SOFT TISSUE at 10:52

## 2025-08-01 RX ADMIN — BUPIVACAINE HYDROCHLORIDE 4 ML: 2.5 INJECTION, SOLUTION EPIDURAL; INFILTRATION; INTRACAUDAL; PERINEURAL at 10:52

## 2025-08-15 ENCOUNTER — TELEPHONE (OUTPATIENT)
Dept: PAIN MEDICINE | Facility: CLINIC | Age: 50
End: 2025-08-15